# Patient Record
Sex: FEMALE | Race: WHITE | Employment: FULL TIME | URBAN - METROPOLITAN AREA
[De-identification: names, ages, dates, MRNs, and addresses within clinical notes are randomized per-mention and may not be internally consistent; named-entity substitution may affect disease eponyms.]

---

## 2018-01-29 ENCOUNTER — OFFICE VISIT (OUTPATIENT)
Dept: OBGYN CLINIC | Facility: CLINIC | Age: 58
End: 2018-01-29

## 2018-01-29 VITALS
DIASTOLIC BLOOD PRESSURE: 78 MMHG | BODY MASS INDEX: 33 KG/M2 | WEIGHT: 190.19 LBS | SYSTOLIC BLOOD PRESSURE: 147 MMHG | HEART RATE: 75 BPM

## 2018-01-29 DIAGNOSIS — Z01.419 ENCOUNTER FOR GYNECOLOGICAL EXAMINATION: Primary | ICD-10-CM

## 2018-01-29 DIAGNOSIS — N84.1 CERVICAL POLYP: ICD-10-CM

## 2018-01-29 DIAGNOSIS — Z12.4 SCREENING FOR MALIGNANT NEOPLASM OF CERVIX: ICD-10-CM

## 2018-01-29 PROCEDURE — 99386 PREV VISIT NEW AGE 40-64: CPT | Performed by: OBSTETRICS & GYNECOLOGY

## 2018-01-30 LAB — HPV I/H RISK 1 DNA SPEC QL NAA+PROBE: NEGATIVE

## 2018-01-30 NOTE — PROGRESS NOTES
Chris Stevens is a 59year old female  No LMP recorded. Patient is not currently having periods (Reason: Menopause). here for annual exam.       Last seen 13. Last pap 2013. Last mammogram with PCP 10/2017. No gyne issues.     OBSTETRICS flashes  Eyes:  denies blurred or double vision  Cardiovascular:  denies chest pain or palpitations  Respiratory:  denies shortness of breath  Gastrointestinal:  denies heartburn, abdominal pain, diarrhea or constipation  Genitourinary:  denies dysuria, in years.   Annual exams encouraged. RTC 1 year or prn    2.  Cervical polyp  Cervical polyp sent to pathology      No prescriptions requested or ordered in this encounter      Александр Vigil MD  1/30/2018  10:42 AM

## 2018-07-31 PROBLEM — E66.1 CLASS 1 DRUG-INDUCED OBESITY WITH SERIOUS COMORBIDITY AND BODY MASS INDEX (BMI) OF 32.0 TO 32.9 IN ADULT: Status: ACTIVE | Noted: 2018-07-31

## 2018-07-31 PROBLEM — E66.811 CLASS 1 DRUG-INDUCED OBESITY WITH SERIOUS COMORBIDITY AND BODY MASS INDEX (BMI) OF 32.0 TO 32.9 IN ADULT: Status: ACTIVE | Noted: 2018-07-31

## 2018-07-31 PROBLEM — E78.00 HYPERCHOLESTEROLEMIA: Status: ACTIVE | Noted: 2018-07-31

## 2020-02-17 ENCOUNTER — OFFICE VISIT (OUTPATIENT)
Dept: OBGYN CLINIC | Facility: CLINIC | Age: 60
End: 2020-02-17
Payer: COMMERCIAL

## 2020-02-17 VITALS
DIASTOLIC BLOOD PRESSURE: 88 MMHG | BODY MASS INDEX: 34 KG/M2 | HEART RATE: 69 BPM | WEIGHT: 198.19 LBS | SYSTOLIC BLOOD PRESSURE: 148 MMHG

## 2020-02-17 DIAGNOSIS — Z01.419 ENCOUNTER FOR GYNECOLOGICAL EXAMINATION: Primary | ICD-10-CM

## 2020-02-17 PROCEDURE — 99396 PREV VISIT EST AGE 40-64: CPT | Performed by: OBSTETRICS & GYNECOLOGY

## 2020-02-17 NOTE — PROGRESS NOTES
Unique Crowe is a 62year old female  No LMP recorded. Patient is postmenopausal. here for annual exam.       Last seen 18. Last pap 2018 normal with neg HPV.   Last mammogram with PCP 2020    No gyne issues  Goes to [facility]    OBS not taking: Reported on 2/17/2020 ) 100 tablet 3       ALLERGIES:    Antihistamine [Clar*    OTHER (SEE COMMENTS)    Comment:tachycardia      Review of Systems:  Constitutional:  Denies fatigue, night sweats, hot flashes  Eyes:  denies blurred or double vi tenderness  Perineum/anus: normal      Assessment & Plan:    Addie Berry is a 62year old female who presents for an annual physical exam.    1. Encounter for gynecological examination  Pap not done. ASCCP guidelines reviewed.    Encouraged annual exam.

## 2021-10-11 ENCOUNTER — OFFICE VISIT (OUTPATIENT)
Dept: OBGYN CLINIC | Facility: CLINIC | Age: 61
End: 2021-10-11
Payer: COMMERCIAL

## 2021-10-11 VITALS
BODY MASS INDEX: 36 KG/M2 | WEIGHT: 209 LBS | SYSTOLIC BLOOD PRESSURE: 156 MMHG | DIASTOLIC BLOOD PRESSURE: 78 MMHG | HEART RATE: 84 BPM

## 2021-10-11 DIAGNOSIS — Z01.419 ENCOUNTER FOR GYNECOLOGICAL EXAMINATION: Primary | ICD-10-CM

## 2021-10-11 DIAGNOSIS — Z12.4 SCREENING FOR MALIGNANT NEOPLASM OF CERVIX: ICD-10-CM

## 2021-10-11 PROCEDURE — 3078F DIAST BP <80 MM HG: CPT | Performed by: OBSTETRICS & GYNECOLOGY

## 2021-10-11 PROCEDURE — 99396 PREV VISIT EST AGE 40-64: CPT | Performed by: OBSTETRICS & GYNECOLOGY

## 2021-10-11 PROCEDURE — 3077F SYST BP >= 140 MM HG: CPT | Performed by: OBSTETRICS & GYNECOLOGY

## 2021-10-11 NOTE — PROGRESS NOTES
Lee Chanel is a 59year old female  No LMP recorded. Patient is postmenopausal. here for annual exam.       Last seen 2020. Last pap 2018 normal with neg HPV. Gets mammogram order from [facility] PCP. Last mammogram 3/2021.      Goes to [facility] Cholecalciferol (VITAMIN D3) 400 UNITS Oral Cap Take 400 mg by mouth daily.          ALLERGIES:    Antihistamine [Clar*    OTHER (SEE COMMENTS)    Comment:tachycardia  Cats Claw, Uncaria *    Coughing      Review of Systems:  Constitutional:  Denies fatigue tenderness  Perineum/anus: normal      Assessment & Plan:    Clarence Pizarro is a 59year old female who presents for an annual physical exam.    1. Encounter for gynecological examination  Pap and HPV. ASCCP guidelines reviewed.    Encouraged annual exam.

## 2025-04-07 ENCOUNTER — OFFICE VISIT (OUTPATIENT)
Dept: OBGYN CLINIC | Facility: CLINIC | Age: 65
End: 2025-04-07

## 2025-04-07 VITALS
DIASTOLIC BLOOD PRESSURE: 98 MMHG | BODY MASS INDEX: 35.85 KG/M2 | SYSTOLIC BLOOD PRESSURE: 152 MMHG | HEIGHT: 64 IN | WEIGHT: 210 LBS | HEART RATE: 73 BPM

## 2025-04-07 DIAGNOSIS — Z01.419 ENCOUNTER FOR GYNECOLOGICAL EXAMINATION: Primary | ICD-10-CM

## 2025-04-07 RX ORDER — ATORVASTATIN CALCIUM 10 MG/1
10 TABLET, FILM COATED ORAL DAILY
COMMUNITY
Start: 2024-09-26

## 2025-04-07 NOTE — PROGRESS NOTES
The following individual(s) verbally consented to be recorded using ambient AI listening technology and understand that they can each withdraw their consent to this listening technology at any point by asking the clinician to turn off or pause the recording:    Patient name: Chelsie Martínez

## 2025-04-08 LAB — HPV E6+E7 MRNA CVX QL NAA+PROBE: NEGATIVE

## 2025-04-08 NOTE — PROGRESS NOTES
Chelsie Martínez is a 64 year old female  No LMP recorded. Patient is postmenopausal. here for annual exam.       Last seen 10/11/2021.      History of Present Illness  The patient presents for her annual gynecological exam.    Last pap 10/2021 normal with negative HPV  Last mammogram 2024 at [facility]    Has been under stress.   Had some dental issues.  Will monitor BP    Her  underwent triple bypass surgery in last couple of months.  Will be returning to [facility].      OBSTETRICS HISTORY:  OB History    Para Term  AB Living   3 3 3 0 0 3   SAB IAB Ectopic Multiple Live Births   0 0 0 0 3       GYNE HISTORY   Pap Date: 18  Pap Result Notes: PAP/HPV NEG  Follow Up Recommendation: MAMMO 3/29/21 BILAT NEG    MEDICAL HISTORY:  Past Medical History:    ALLERGIC RHINITIS    OTHER DISEASES    urticaria, cold induced     Past Surgical History:   Procedure Laterality Date    Colonoscopy & polypectomy  10/15    polyp; tics; repeat 10 yrs (hyperplastic)    Colonoscopy,biopsy N/A 10/13/2015    Procedure: COLONOSCOPY, POSSIBLE BIOPSY, POSSIBLE POLYPECTOMY 47557;  Surgeon: Kurtis Kimbrough MD;  Location: [facility], [facility]    Cyst aspiration right         SOCIAL HISTORY:  Social History     Socioeconomic History    Marital status:    Tobacco Use    Smoking status: Never     Passive exposure: Never    Smokeless tobacco: Never   Vaping Use    Vaping status: Never Used   Substance and Sexual Activity    Alcohol use: Yes     Comment: socail amounts    Drug use: No       FAMILY HISTORY:  Family History   Problem Relation Age of Onset    Cancer Father 57        mesothelioma    Heart Disorder Mother 44        MI first, CABG mid 70's    Other (Other) Mother         macular degeneration    Heart Disorder Sister         CHf suspected    Obesity Sister     Breast Cancer Maternal Aunt 45        age of dx 45    Other (Other) Maternal Aunt         macular degeneration    Other (Other)  Maternal Aunt         macular degeneration    Hypertension Brother     Breast Cancer Maternal Grandmother 70        age 70s    Prostate Cancer Brother     Other (stage 4 melanoma) Nephew        MEDICATIONS:  Current Outpatient Medications   Medication Sig Dispense Refill    atorvastatin 10 MG Oral Tab Take 1 tablet (10 mg total) by mouth daily.      diclofenac 1 % External Gel Apply 2 g topically 4 (four) times daily as needed. (Patient not taking: Reported on 4/7/2025)      Cholecalciferol (VITAMIN D3) 400 UNITS Oral Cap Take 400 mg by mouth daily. (Patient not taking: Reported on 4/7/2025)         ALLERGIES:  Allergies[1]      Depression Screening (PHQ-2/PHQ-9): Over the LAST 2 WEEKS   Little interest or pleasure in doing things (over the last two weeks)?: Not at all    Feeling down, depressed, or hopeless (over the last two weeks)?: Not at all    PHQ-2 SCORE: 0           Review of Systems:  Constitutional:  Denies fatigue, night sweats, hot flashes  Eyes:  denies blurred or double vision  Cardiovascular:  denies chest pain or palpitations  Respiratory:  denies shortness of breath  Gastrointestinal:  denies heartburn, abdominal pain, diarrhea or constipation  Genitourinary:  denies dysuria, incontinence, abnormal vaginal discharge, vaginal itching  Musculoskeletal:  denies back pain.  Skin/Breast:  Denies any breast pain, lumps, or discharge.   Neurological:  denies headaches, extremity weakness or numbness.  Psychiatric: denies depression or anxiety.  Endocrine:   denies excessive thirst or urination.  Heme/Lymph:  easy bruising or bleeding.    PHYSICAL EXAM:   BP (!) 152/98   Pulse 73   Ht 5' 4\" (1.626 m)   Wt 210 lb (95.3 kg)   BMI 36.05 kg/m²   Wt Readings from Last 2 Encounters:   04/07/25 210 lb (95.3 kg)   10/11/21 209 lb (94.8 kg)     Body mass index is 36.05 kg/m².    Constitutional: well developed, well nourished  Neck/Thyroid: thyroid symmetric, no nodules  Heart:  Regular rate and rhythm  Lungs:   Clear to asculation  Breast: normal without palpable masses, tenderness, asymmetry, nipple discharge, nipple retraction or skin changes  Abdomen:  soft, nontender, nondistended, no masses  Psychiatric:  Oriented to time, place, person and situation. Appropriate mood and affect    Pelvic Exam:  External Genitalia: normal appearance, hair distribution, and no lesions  Urethral Meatus:  normal in size, location, without lesions and prolapse  Bladder:  No fullness, masses or tenderness  Vagina:  Normal appearance without lesions, no abnormal discharge  Cervix:  Normal without tenderness on motion  Uterus: normal in size, contour, position, mobility, without tenderness  Adnexa: normal without masses or tenderness  Perineum/anus: normal      Assessment & Plan:    Chelsie Martínez is a 64 year old female who presents for an annual physical exam.    1. Encounter for gynecological examination  Pap and HPV.   Annual exams encouraged.  Order for mammogram to be given by PCP.   RTC 1 year or prn         Requested Prescriptions      No prescriptions requested or ordered in this encounter         Delma Starr MD  4/7/2025  9:18 PM         [1]   Allergies  Allergen Reactions    Antihistamine [Clarithromycin] OTHER (SEE COMMENTS)     tachycardia    Cats Claw, Uncaria Tomentosa Coughing